# Patient Record
Sex: MALE | Race: WHITE | URBAN - METROPOLITAN AREA
[De-identification: names, ages, dates, MRNs, and addresses within clinical notes are randomized per-mention and may not be internally consistent; named-entity substitution may affect disease eponyms.]

---

## 2019-05-22 ENCOUNTER — OFFICE VISIT (OUTPATIENT)
Dept: URGENT CARE | Facility: CLINIC | Age: 59
End: 2019-05-22
Payer: COMMERCIAL

## 2019-05-22 VITALS
HEART RATE: 65 BPM | DIASTOLIC BLOOD PRESSURE: 73 MMHG | SYSTOLIC BLOOD PRESSURE: 116 MMHG | TEMPERATURE: 98 F | OXYGEN SATURATION: 95 % | RESPIRATION RATE: 16 BRPM | BODY MASS INDEX: 29.82 KG/M2 | HEIGHT: 67 IN | WEIGHT: 190 LBS

## 2019-05-22 DIAGNOSIS — M79.672 LEFT FOOT PAIN: Primary | ICD-10-CM

## 2019-05-22 PROCEDURE — 73630 XR FOOT COMPLETE 3 VIEW LEFT: ICD-10-PCS | Mod: LT,S$GLB,, | Performed by: RADIOLOGY

## 2019-05-22 PROCEDURE — 73630 X-RAY EXAM OF FOOT: CPT | Mod: LT,S$GLB,, | Performed by: RADIOLOGY

## 2019-05-22 PROCEDURE — 99203 OFFICE O/P NEW LOW 30 MIN: CPT | Mod: S$GLB,,, | Performed by: FAMILY MEDICINE

## 2019-05-22 PROCEDURE — 99203 PR OFFICE/OUTPT VISIT, NEW, LEVL III, 30-44 MIN: ICD-10-PCS | Mod: S$GLB,,, | Performed by: FAMILY MEDICINE

## 2019-05-22 RX ORDER — PREDNISONE 20 MG/1
40 TABLET ORAL DAILY
Qty: 10 TABLET | Refills: 0 | Status: SHIPPED | OUTPATIENT
Start: 2019-05-22 | End: 2019-05-27

## 2019-05-22 RX ORDER — NAPROXEN 500 MG/1
500 TABLET ORAL 2 TIMES DAILY WITH MEALS
Qty: 20 TABLET | Refills: 0 | Status: SHIPPED | OUTPATIENT
Start: 2019-05-22

## 2019-05-22 RX ORDER — TRAMADOL HYDROCHLORIDE 50 MG/1
50 TABLET ORAL EVERY 6 HOURS PRN
Qty: 20 TABLET | Refills: 0 | Status: SHIPPED | OUTPATIENT
Start: 2019-05-22 | End: 2020-05-21

## 2019-05-22 RX ORDER — ATORVASTATIN CALCIUM 20 MG/1
TABLET, FILM COATED ORAL
COMMUNITY
Start: 2014-12-15

## 2019-05-22 NOTE — PATIENT INSTRUCTIONS
Please drink plenty of fluids.  Please get plenty of rest.  Please return here or go to the Emergency Department for any concerns or worsening of condition.    If you were prescribed a narcotic medication, do not drive or operate heavy equipment or machinery while taking these medications.    Use a Dr. Coreas's pad with heel area removed to relieve pressure on heel.  If crutches were recommended, use them for touch down ambulation to take weight off the affected foot.      If you were not prescribed an anti-inflammatory medication, and if you do not have any history of stomach/intestinal ulcers, or kidney disease, or are not taking a blood thinner such as Coumadin, Plavix, Pradaxa, Eloquis, or Xaralta for example, it is OK to take over the counter Ibuprofen or Advil or Motrin or Aleve as directed.  Do not take these medications on an empty stomach.  If not allergic, please take over the counter Tylenol (Acetaminophen) and/or Motrin (Ibuprofen) as directed for control of pain and/or fever.    Please follow up with your primary care doctor or specialist as needed.  Theo Gandara MD  [phone]    Podiatry - [place]    Dr. Gomez Earl  [address] [place] [address].  [place]  [address]  [phone]      Plantar Fasciitis  Plantar fasciitis is a painful swelling of the plantar fascia. The plantar fascia is a thick, fibrous layer of tissue. It covers the bones on the bottom of your foot. And it supports the foot bones in an arched position.  This can happen gradually or suddenly. It usually affects one foot at a time. Heel pain can be sharp, like a knife sticking into the bottom of your foot. You may feel pain after exercising, long-distance jogging, stair climbing, long periods of standing, or after standing up.  Risk factors include: non-active lifestyle, arthritis, diabetes, obesity or recent weight gain, flat foot, high arch. Wearing high heels, loose shoes, or shoes with poor arch support for long periods of time  adds to the risk. This problem is commonly found in runners and dancers. It also found in people who stand on hard surfaces for long periods of time.  Foot pain from this condition is usually worse in the morning. But it improves with walking. By the end of the day there may be a dull aching. Treatment requires short-term rest and controlling swelling. It may take up to 9 months before all symptoms go away. Rarely, a steroid injection into the foot, or surgery, may be needed.  Home care  · If you are overweight, lose weight to help healing.  · Choose supportive shoes with good arch support and shock absorbency. Replace athletic shoes when they become worn out. Dont walk or run barefoot.  · Premade or custom-fitted shoe inserts may be helpful. Inserts made of silicone seem to be the most effective. Custom-made inserts can be provided by a podiatrist or foot specialist, physical therapist, or orthopedist.  · Premade or custom-made night splints keep the heel stretched out while you sleep. They may prevent morning pain.  · Avoid activities that stress the feet: jogging, prolonged standing or walking, contact sports, etc.  · First thing in the morning and before sports, stretch the bottom of your feet. Gently flex your ankle so the toes move toward your knee.  · Icing may help control heel pain. Apply an ice pack to the heel for 10-20 minutes as a preventive. Or ice your heel after a severe flare-up of symptoms. You may repeat this every 1-2 hours as needed.  · You may use over-the-counter pain medicine to control pain, unless another medicine was prescribed. Anti-inflammatory pain medicines, such as ibuprofen or naproxen, may work better than acetaminophen. If you have chronic liver or kidney disease or ever had a stomach ulcer or GI bleeding, talk with your healthcare provider before using these medicines.  Follow-up care  Follow up with your healthcare provider, physical therapist, or podiatrist or foot specialist  as advised.  Call for an appointment if pain worsens or there is no relief after a few weeks of home treatment. Shoe inserts, a night splint, or a special boot may be required.  If X-rays were taken, you will be told of any new findings that may affect your care.  When to seek medical advice  Call your healthcare provider right away if any of these occur:  · Foot swelling  · Redness with increasing pain  Date Last Reviewed: 11/21/2015 © 2000-2017 [employer]. [address] [place] [address], [place] [address]. All rights reserved. This information is not intended as a substitute for professional medical care. Always follow your healthcare professional's instructions.

## 2019-05-22 NOTE — PROGRESS NOTES
"Subjective:       Patient ID: Femi Geronimo is a 59 y.o. male.    Vitals:    05/22/19 1215   BP: 116/73   Pulse: 65   Resp: 16   Temp: 97.5 °F (36.4 °C)   SpO2: 95%   Weight: 86.2 kg (190 lb)   Height: 5' 7" (1.702 m)       Chief Complaint: Foot Pain    Pt states left foot pain distal First MT x 3 weeks. Pt states pain is worse after being on his feet all day.    Foot Pain   This is a new problem. The current episode started 1 to 4 weeks ago. The problem occurs constantly. The problem has been unchanged. Associated symptoms include joint swelling. Pertinent negatives include no abdominal pain, chest pain, chills, fever, headaches, nausea, rash, sore throat or vomiting. The symptoms are aggravated by walking. Treatments tried: ibuprofen. The treatment provided moderate relief.     Review of Systems   Constitution: Negative for chills and fever.   HENT: Negative for sore throat.    Eyes: Negative for blurred vision.   Cardiovascular: Negative for chest pain.   Respiratory: Negative for shortness of breath.    Skin: Negative for rash.   Musculoskeletal: Positive for joint pain and joint swelling. Negative for back pain.   Gastrointestinal: Negative for abdominal pain, diarrhea, nausea and vomiting.   Neurological: Negative for headaches.   Psychiatric/Behavioral: The patient is not nervous/anxious.        Objective:      Physical Exam   Constitutional: He is oriented to person, place, and time. He appears well-developed and well-nourished. He is cooperative.  Non-toxic appearance. He does not appear ill. No distress.   HENT:   Head: Normocephalic and atraumatic. Head is without abrasion, without contusion and without laceration.   Right Ear: Hearing, tympanic membrane, external ear and ear canal normal. No hemotympanum.   Left Ear: Hearing, tympanic membrane, external ear and ear canal normal. No hemotympanum.   Nose: Nose normal. No mucosal edema, rhinorrhea or nasal deformity. No epistaxis. Right sinus exhibits no " maxillary sinus tenderness and no frontal sinus tenderness. Left sinus exhibits no maxillary sinus tenderness and no frontal sinus tenderness.   Mouth/Throat: Uvula is midline, oropharynx is clear and moist and mucous membranes are normal. No trismus in the jaw. Normal dentition. No uvula swelling. No posterior oropharyngeal erythema.   Eyes: Pupils are equal, round, and reactive to light. Conjunctivae, EOM and lids are normal. Right eye exhibits no discharge. Left eye exhibits no discharge. No scleral icterus.   Sclera clear bilat   Neck: Trachea normal, normal range of motion, full passive range of motion without pain and phonation normal. Neck supple. No spinous process tenderness and no muscular tenderness present. No neck rigidity. No tracheal deviation present.   Cardiovascular: Normal rate, regular rhythm, normal heart sounds, intact distal pulses and normal pulses.   Pulmonary/Chest: Effort normal and breath sounds normal. No respiratory distress.   Abdominal: Soft. Normal appearance and bowel sounds are normal. He exhibits no distension, no pulsatile midline mass and no mass. There is no tenderness.   Musculoskeletal: He exhibits no edema or deformity.        Left foot: There is decreased range of motion, tenderness and swelling.        Feet:    Neurological: He is alert and oriented to person, place, and time. He has normal strength. No cranial nerve deficit or sensory deficit. He exhibits normal muscle tone. He displays no seizure activity. Coordination normal. GCS eye subscore is 4. GCS verbal subscore is 5. GCS motor subscore is 6.   Skin: Skin is warm, dry and intact. Capillary refill takes less than 2 seconds. No abrasion, no bruising, no burn, no ecchymosis and no laceration noted. He is not diaphoretic. No pallor.   Psychiatric: He has a normal mood and affect. His speech is normal and behavior is normal. Judgment and thought content normal. Cognition and memory are normal.   Nursing note and vitals  reviewed.    Type of Interpretation: ED Physician (Independently Interpreted).  Radiology Procedure Done: Left Foot.  Interpretation: No fx seen.        Assessment:       1. Left foot pain        Plan:       Femi was seen today for foot pain.    Diagnoses and all orders for this visit:    Left foot pain  -     X-Ray Foot Complete Left; Future  -     predniSONE (DELTASONE) 20 MG tablet; Take 2 tablets (40 mg total) by mouth once daily. for 5 days  -     naproxen (NAPROSYN) 500 MG tablet; Take 1 tablet (500 mg total) by mouth 2 (two) times daily with meals.  -     traMADol (ULTRAM) 50 mg tablet; Take 1 tablet (50 mg total) by mouth every 6 (six) hours as needed for Pain.    Please drink plenty of fluids.  Please get plenty of rest.  Please return here or go to the Emergency Department for any concerns or worsening of condition.    If you were prescribed a narcotic medication, do not drive or operate heavy equipment or machinery while taking these medications.    Use a Dr. Coreas's pad with heel area removed to relieve pressure on heel.  If crutches were recommended, use them for touch down ambulation to take weight off the affected foot.      If you were not prescribed an anti-inflammatory medication, and if you do not have any history of stomach/intestinal ulcers, or kidney disease, or are not taking a blood thinner such as Coumadin, Plavix, Pradaxa, Eloquis, or Xaralta for example, it is OK to take over the counter Ibuprofen or Advil or Motrin or Aleve as directed.  Do not take these medications on an empty stomach.  If not allergic, please take over the counter Tylenol (Acetaminophen) and/or Motrin (Ibuprofen) as directed for control of pain and/or fever.    Please follow up with your primary care doctor or specialist as needed.  Theo Gandara MD  [phone]    Podiatry - [facility]  [address] [place] [address].  [place].  [id number]  [phone]

## 2019-05-22 NOTE — LETTER
May 22, 2019  Femi Geronimo  [address] [place] [address] Dr Samara [place] [id number]                [facility] [address] [place] [facility] [address]  Phone: [phone]  Fax: [phone] Femi Geronimo was seen and treated in our Urgent Care department   on 5/22/2019. He may return to work in 2 - 3 days.      If you have any questions or concerns, please don't hesitate to call.    Sincerely,        Amish Obrien MD

## 2019-05-28 ENCOUNTER — TELEPHONE (OUTPATIENT)
Dept: URGENT CARE | Facility: CLINIC | Age: 59
End: 2019-05-28

## 2019-10-03 ENCOUNTER — OFFICE VISIT (OUTPATIENT)
Dept: URGENT CARE | Facility: CLINIC | Age: 59
End: 2019-10-03
Payer: COMMERCIAL

## 2019-10-03 VITALS
TEMPERATURE: 99 F | OXYGEN SATURATION: 97 % | WEIGHT: 215 LBS | HEIGHT: 67 IN | SYSTOLIC BLOOD PRESSURE: 123 MMHG | HEART RATE: 66 BPM | BODY MASS INDEX: 33.74 KG/M2 | RESPIRATION RATE: 18 BRPM | DIASTOLIC BLOOD PRESSURE: 72 MMHG

## 2019-10-03 DIAGNOSIS — S93.401A SPRAIN OF RIGHT ANKLE, UNSPECIFIED LIGAMENT, INITIAL ENCOUNTER: Primary | ICD-10-CM

## 2019-10-03 PROCEDURE — 99214 OFFICE O/P EST MOD 30 MIN: CPT | Mod: S$GLB,,, | Performed by: FAMILY MEDICINE

## 2019-10-03 PROCEDURE — 73610 X-RAY EXAM OF ANKLE: CPT | Mod: RT,S$GLB,, | Performed by: RADIOLOGY

## 2019-10-03 PROCEDURE — 99214 PR OFFICE/OUTPT VISIT, EST, LEVL IV, 30-39 MIN: ICD-10-PCS | Mod: S$GLB,,, | Performed by: FAMILY MEDICINE

## 2019-10-03 PROCEDURE — 73610 XR ANKLE COMPLETE 3 VIEW RIGHT: ICD-10-PCS | Mod: RT,S$GLB,, | Performed by: RADIOLOGY

## 2019-10-03 RX ORDER — NAPROXEN 500 MG/1
500 TABLET ORAL 2 TIMES DAILY WITH MEALS
Qty: 20 TABLET | Refills: 0 | Status: SHIPPED | OUTPATIENT
Start: 2019-10-03 | End: 2019-10-13

## 2019-10-03 NOTE — PATIENT INSTRUCTIONS
PLEASE READ YOUR DISCHARGE INSTRUCTIONS ENTIRELY AS IT CONTAINS IMPORTANT INFORMATION.    Naproxen twice daily with food    Rest, ice, compress, and elevate at home    Avoid prolonged use of the affected area until better.     Please return or see your primary care doctor if you develop new or worsening symptoms.     Please arrange follow up with your primary medical clinic as soon as possible. You must understand that you've received an Urgent Care treatment only and that you may be released before all of your medical problems are known or treated. You, the patient, will arrange for follow up as instructed. If your symptoms worsen or fail to improve you should go to the Emergency Room.    Treating Ankle Sprains  Treatment will depend on how bad your sprain is. For a severe sprain, healing may take 3 months or more.  Right after your injury: Use R.I.C.E.  · Rest: At first, keep weight off the ankle as much as you can. You may be given crutches to help you walk without putting weight on the ankle.  · Ice: Put an ice pack on the ankle for 15 minutes. Remove the pack and wait at least 30 minutes. Repeat for up to 3 days. This helps reduce swelling.  · Compression: To reduce swelling and keep the joint stable, you may need to wrap the ankle with an elastic bandage. For more severe sprains, you may need an ankle brace or a cast.  · Elevation: To reduce swelling, keep your ankle raised above your heart when you sit or lie down.  Medicine  Your healthcare provider may suggest oral non-steroidal anti-inflammatory medicine (NSAIDs), such as ibuprofen. This relieves the pain and helps reduce any swelling. Be sure to take your medicine as directed.  Contrast baths  After 3 days, soak your ankle in warm water for 30 seconds, then in cool water for 30 seconds. Go back and forth for 5 minutes. Doing this every 2 hours will help keep the swelling down.  Exercises    After about 2 to 3 weeks, you may be given exercises to  strengthen the ligaments and muscles in the ankle. Doing these exercises will help prevent another ankle sprain. Exercises may include standing on your toes and then on your heels and doing ankle curls.  Ankle curls  · Sit on the edge of a sturdy table or lie on your back.  · Pull your toes toward you. Then point them away from you. Repeat for 2 to 3 minutes.   Date Last Reviewed: 9/28/2015  © 3190-2579 [employer]. [address] [place] [address], [place] [address]. All rights reserved. This information is not intended as a substitute for professional medical care. Always follow your healthcare professional's instructions.

## 2019-10-03 NOTE — PROGRESS NOTES
"Subjective:       Patient ID: Femi Geronimo is a 59 y.o. male.    Vitals:  height is 5' 7" (1.702 m) and weight is 97.5 kg (215 lb). His temperature is 99.4 °F (37.4 °C). His blood pressure is 123/72 and his pulse is 66. His respiration is 18 and oxygen saturation is 97%.     Chief Complaint: Ankle Injury (Right )    Pt states he twisted (everted) his right ankle last Sunday working around the house. Pt states the injury doesn't feel like it's healing and the pain is starting to radiate up his right leg if walking too long. Has been taking ibuprofen and tylenol.     Ankle Injury    The incident occurred more than 1 week ago. The incident occurred at home. The injury mechanism was a twisting injury. The pain is present in the right ankle. The quality of the pain is described as shooting. The pain is moderate. The pain has been constant since onset. Associated symptoms include an inability to bear weight. He reports no foreign bodies present. The symptoms are aggravated by movement and weight bearing. He has tried NSAIDs (IB ) for the symptoms. The treatment provided no relief.       Constitution: Negative for chills, fatigue and fever.   HENT: Negative for congestion and sore throat.    Neck: Negative for painful lymph nodes.   Cardiovascular: Negative for chest pain and leg swelling.   Eyes: Negative for double vision and blurred vision.   Respiratory: Negative for cough and shortness of breath.    Gastrointestinal: Negative for nausea, vomiting and diarrhea.   Genitourinary: Negative for dysuria, frequency and urgency.   Musculoskeletal: Positive for pain, joint pain and joint swelling. Negative for muscle cramps and muscle ache.   Skin: Negative for color change, pale and rash.   Allergic/Immunologic: Negative for seasonal allergies.   Neurological: Negative for dizziness, history of vertigo, light-headedness, passing out and headaches.   Hematologic/Lymphatic: Negative for swollen lymph nodes, easy " bruising/bleeding and history of blood clots. Does not bruise/bleed easily.   Psychiatric/Behavioral: Negative for nervous/anxious, sleep disturbance and depression. The patient is not nervous/anxious.        Objective:      Physical Exam   Constitutional: He is oriented to person, place, and time. He appears well-developed and well-nourished. He is cooperative.  Non-toxic appearance. He does not appear ill. No distress.   HENT:   Head: Normocephalic and atraumatic. Head is without abrasion, without contusion and without laceration.   Right Ear: Hearing, tympanic membrane, external ear and ear canal normal. No hemotympanum.   Left Ear: Hearing, tympanic membrane, external ear and ear canal normal. No hemotympanum.   Nose: Nose normal. No mucosal edema, rhinorrhea or nasal deformity. No epistaxis. Right sinus exhibits no maxillary sinus tenderness and no frontal sinus tenderness. Left sinus exhibits no maxillary sinus tenderness and no frontal sinus tenderness.   Mouth/Throat: Uvula is midline, oropharynx is clear and moist and mucous membranes are normal. No trismus in the jaw. Normal dentition. No uvula swelling. No posterior oropharyngeal erythema.   Eyes: Pupils are equal, round, and reactive to light. Conjunctivae, EOM and lids are normal. Right eye exhibits no discharge. Left eye exhibits no discharge. No scleral icterus.   Sclera clear bilat   Neck: Trachea normal, normal range of motion, full passive range of motion without pain and phonation normal. Neck supple. No spinous process tenderness and no muscular tenderness present. No neck rigidity. No tracheal deviation present.   Cardiovascular: Normal rate, regular rhythm, normal heart sounds, intact distal pulses and normal pulses.   Pulmonary/Chest: Effort normal and breath sounds normal. No respiratory distress.   Abdominal: Soft. Normal appearance and bowel sounds are normal. He exhibits no distension, no pulsatile midline mass and no mass. There is no  tenderness.   Musculoskeletal: Normal range of motion. He exhibits no edema or deformity.        Right ankle: He exhibits swelling (mild medial). He exhibits normal range of motion and no laceration. Tenderness. Medial malleolus tenderness found. Achilles tendon exhibits no pain.   Cap refill 2 seconds, pedal pulse 2+, sensation intact  Able to bear weight and walk but with limp   Neurological: He is alert and oriented to person, place, and time. He has normal strength. No cranial nerve deficit or sensory deficit. He exhibits normal muscle tone. He displays no seizure activity. Coordination normal. GCS eye subscore is 4. GCS verbal subscore is 5. GCS motor subscore is 6.   Skin: Skin is warm, dry and intact. Capillary refill takes less than 2 seconds. No abrasion, no bruising, no burn, no ecchymosis and no laceration noted. He is not diaphoretic. No pallor.   Psychiatric: He has a normal mood and affect. His speech is normal and behavior is normal. Judgment and thought content normal. Cognition and memory are normal.   Nursing note and vitals reviewed.    Xr Ankle Complete 3 View Right    Result Date: 10/3/2019  EXAMINATION: XR ANKLE COMPLETE 3 VIEW RIGHT CLINICAL HISTORY: foot inverted 1.5 weeks ago, pain medial; Unspecified injury of right ankle, initial encounter TECHNIQUE: AP, lateral, and oblique images of the right ankle were performed. COMPARISON: None FINDINGS: Three views right ankle. No acute displaced fracture or dislocation of the ankle.  No radiopaque foreign body.  The ankle mortise is intact.  Degenerative changes are noted of the calcaneus.  There is mild edema about the ankle, particularly overlying the medial malleolus.     1. No acute displaced fracture or dislocation of the ankle noting nonspecific edema. Electronically signed by: Tristin Pizarro MD Date:    10/03/2019 Time:    15:44    Assessment:       1. Sprain of right ankle, unspecified ligament, initial encounter        Plan:         Sprain  of right ankle, unspecified ligament, initial encounter  -     XR ANKLE COMPLETE 3 VIEW RIGHT; Future; Expected date: 10/03/2019  -     naproxen (NAPROSYN) 500 MG tablet; Take 1 tablet (500 mg total) by mouth 2 (two) times daily with meals. for 10 days  Dispense: 20 tablet; Refill: 0     Declined Ace wrap or ankle brace in the clinic will get something at his pharmacy  F/u with ortho if pain not improving.    Patient Instructions     PLEASE READ YOUR DISCHARGE INSTRUCTIONS ENTIRELY AS IT CONTAINS IMPORTANT INFORMATION.    Naproxen twice daily with food    Rest, ice, compress, and elevate at home    Avoid prolonged use of the affected area until better.     Please return or see your primary care doctor if you develop new or worsening symptoms.     Please arrange follow up with your primary medical clinic as soon as possible. You must understand that you've received an Urgent Care treatment only and that you may be released before all of your medical problems are known or treated. You, the patient, will arrange for follow up as instructed. If your symptoms worsen or fail to improve you should go to the Emergency Room.    Treating Ankle Sprains  Treatment will depend on how bad your sprain is. For a severe sprain, healing may take 3 months or more.  Right after your injury: Use R.I.C.E.  · Rest: At first, keep weight off the ankle as much as you can. You may be given crutches to help you walk without putting weight on the ankle.  · Ice: Put an ice pack on the ankle for 15 minutes. Remove the pack and wait at least 30 minutes. Repeat for up to 3 days. This helps reduce swelling.  · Compression: To reduce swelling and keep the joint stable, you may need to wrap the ankle with an elastic bandage. For more severe sprains, you may need an ankle brace or a cast.  · Elevation: To reduce swelling, keep your ankle raised above your heart when you sit or lie down.  Medicine  Your healthcare provider may suggest oral  non-steroidal anti-inflammatory medicine (NSAIDs), such as ibuprofen. This relieves the pain and helps reduce any swelling. Be sure to take your medicine as directed.  Contrast baths  After 3 days, soak your ankle in warm water for 30 seconds, then in cool water for 30 seconds. Go back and forth for 5 minutes. Doing this every 2 hours will help keep the swelling down.  Exercises    After about 2 to 3 weeks, you may be given exercises to strengthen the ligaments and muscles in the ankle. Doing these exercises will help prevent another ankle sprain. Exercises may include standing on your toes and then on your heels and doing ankle curls.  Ankle curls  · Sit on the edge of a sturdy table or lie on your back.  · Pull your toes toward you. Then point them away from you. Repeat for 2 to 3 minutes.   Date Last Reviewed: 9/28/2015  © 8108-5975 [employer]. [address] [place] [address], [place] [address]. All rights reserved. This information is not intended as a substitute for professional medical care. Always follow your healthcare professional's instructions.

## 2020-07-17 ENCOUNTER — CLINICAL SUPPORT (OUTPATIENT)
Dept: URGENT CARE | Facility: CLINIC | Age: 60
End: 2020-07-17
Payer: COMMERCIAL

## 2020-07-17 VITALS — TEMPERATURE: 98 F

## 2020-07-17 DIAGNOSIS — Z01.84 ENCOUNTER FOR ANTIBODY RESPONSE EXAMINATION: ICD-10-CM

## 2020-07-17 PROCEDURE — 99211 OFF/OP EST MAY X REQ PHY/QHP: CPT | Mod: S$GLB,,, | Performed by: FAMILY MEDICINE

## 2020-07-17 PROCEDURE — 86769 SARS-COV-2 COVID-19 ANTIBODY: CPT

## 2020-07-17 PROCEDURE — 99211 PR OFFICE/OUTPT VISIT, EST, LEVL I: ICD-10-PCS | Mod: S$GLB,,, | Performed by: FAMILY MEDICINE

## 2020-07-18 LAB — SARS-COV-2 IGG SERPLBLD QL IA.RAPID: NEGATIVE

## 2020-07-19 ENCOUNTER — TELEPHONE (OUTPATIENT)
Dept: URGENT CARE | Facility: CLINIC | Age: 60
End: 2020-07-19

## 2023-03-09 DIAGNOSIS — M54.50 LOW BACK PAIN: Primary | ICD-10-CM

## 2023-03-09 DIAGNOSIS — M54.50 LOW BACK PAIN, UNSPECIFIED: Primary | ICD-10-CM

## 2023-03-27 ENCOUNTER — CLINICAL SUPPORT (OUTPATIENT)
Dept: REHABILITATION | Facility: HOSPITAL | Age: 63
End: 2023-03-27
Payer: COMMERCIAL

## 2023-03-27 DIAGNOSIS — M54.42 ACUTE LEFT-SIDED LOW BACK PAIN WITH LEFT-SIDED SCIATICA: ICD-10-CM

## 2023-03-27 DIAGNOSIS — M54.32 SCIATIC PAIN, LEFT: ICD-10-CM

## 2023-03-27 PROCEDURE — 97112 NEUROMUSCULAR REEDUCATION: CPT | Mod: PO

## 2023-03-27 PROCEDURE — 97110 THERAPEUTIC EXERCISES: CPT | Mod: PO

## 2023-03-27 PROCEDURE — 97161 PT EVAL LOW COMPLEX 20 MIN: CPT | Mod: PO

## 2023-03-27 NOTE — PROGRESS NOTES
[facility]   Physical Therapy Initial Evaluation     Date: 3/27/2023   Name: Femi eGronimo  Clinic Number: [id number]    Therapy Diagnosis:   Encounter Diagnoses   Name Primary?    Acute left-sided low back pain with left-sided sciatica     Sciatic pain, left      Physician: Adebayo Gaspar MD    Physician Orders: PT Eval and Treat   Medical Diagnosis from Referral: M54.50 (ICD-10-CM) - Low back pain  Evaluation Date: 3/27/2023  Authorization Period Expiration: 12/29/23  Plan of Care Expiration: 6/19/2023  Progress Note Due: 4/24/2023  Visit # / Visits authorized: -/ 20       Precautions: Standard    Time In: 0702  Time Out: 0802  Total Appointment Time (timed & untimed codes): 60 minutes      SUBJECTIVE   Date of onset: 45 days ago    History of current condition - Femi reports: was lifting a bag of cement. He states he felt fine until a week later. He states he started to feel a twinge and pain in his low back with changes in positions and with lying down. Pt states pain that would sometimes radiate into buttocks and L knee. He states since then with pain medication and rest his pain has significantly improved with intermittent twinge of pain.    Falls: None    Imaging, MRI studies: awaiting results    Prior Therapy: None  Social History:  lives with their spouse  Occupation: Retired ; on and off  Prior Level of Function: IND  Current Level of Function: IND with pain    Pain:  Current 2/10, worst 5/10, best 1/10   Location: left back  back - lumbar  Description: Sharp and Shooting  Aggravating Factors: Standing and Laying  Easing Factors: rest and sitting    Patients goals: TO get rid of the pain     Medical History:   Past Medical History:   Diagnosis Date    Hyperlipidemia        Surgical History:   Femi Geronimo  has a past surgical history that includes Ganglion cyst excision.    Medications:   Femi has a current medication list which includes the following  "prescription(s): atorvastatin and naproxen.    Allergies:   Review of patient's allergies indicates:  No Known Allergies       OBJECTIVE     Posture:  Pt with rounded shoulder, increased thoracic kyphosis, and forward head    Lumbar Range of Motion:    Limitations Pain   Flexion 50% Limitation in segmental lumbar mobility   none        Extension 25% limitation   none        Left Side Bending WNL None        Right Side Bending WNL None            Lower Extremity Strength  Right LE  Left LE    Quadriceps: 4+/5 Quadriceps: 4-/5   Hamstrings: 4+/5 Hamstrings: 4/5   Iliospoas: 4+/5 Iliospoas: 4-/5   Hip extension:  4+/5 Hip extension: 4-/5   PGM: 4-/5 PGM: 3+/5   Hip ER:  4-/5 Hip ER: 4-/5   Hip IR: 4/5 Hip IR: 4-/5   Ankle dorsiflexion: 4/5 Ankle dorsiflexion: 3+/5   Ankle plantarflexion: 4/5 Ankle plantarflexion: 3+/5     Sensation:Intact    Reflexes:  -Patellar (L3-L4): +1  -Achilles (S1): +1    Special Tests:  -SLR Test: -  -Repeated Flexion: -  -Repeated Ext: -  -Prone Instability Test: -  -Bridge Test: -  -Slump Test: +    SI Special Tests:   Distraction: -  Compression: -  SI thigh thrust: -  Sacral thrust: -  Flick test: -    Palpation: ttp  -Erector Spinae: -  -Multifidi activation: -    Flexibility:   -Ely's test: R = +; L = +    -Hamstring : R = + ; L = +  -Nam's test: R = + ; L = +   Michael Test Right  Left    Iliopsoas + +   Rectus Femoris  + +       Limitation/Restriction for FOTO EVAL Survey    Therapist reviewed FOTO scores for Femi Geronimo on 3/27/2023.   FOTO documents entered into [employer] - see Media section.    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 The following activities were included:  -1x15 Pelvic Tilts   -2x15 Bridging  -2X15 Standing Hip ABD vs RTB  -2x15 Standing Hip EXT vs RTB      PATIENT EDUCATION AND HOME EXERCISES     Education provided:   - HEP    Written Home Exercises Provided: yes. Exercises were reviewed and Femi was able to demonstrate them prior to the end of the session.  Femi demonstrated good  understanding of the education provided. See EMR under Patient Instructions for exercises provided during therapy sessions.    ASSESSMENT     Femi is a 63 y.o. male referred to outpatient Physical Therapy with a medical diagnosis of M54.50 (ICD-10-CM) - Low back pain. Patient presents with  -Decreased function (LEFS)  -Increased pain (NPS)   -Decreased pain free Lumbar AROM  -Decreased LE strength (MMT)   -Decreased LE mobility   -Decreased participation in regular exercise routine  -(+) TTP on piriformis    Patient prognosis is Excellent.   Patientt will benefit from skilled outpatient Physical Therapy to address the deficits stated above and in the chart below, provide patient /family education, and to maximize patientt's level of independence.     Plan of care discussed with patient: Yes  Patient's spiritual, cultural and educational needs considered and patient is agreeable to the plan of care and goals as stated below:     Anticipated Barriers for therapy: None    Medical Necessity is demonstrated by the following  History  Co-morbidities and personal factors that may impact the plan of care Co-morbidities:   Hyperlipidemia    Personal Factors:   no deficits     low   Examination  Body Structures and Functions, activity limitations and participation restrictions that may impact the plan of care Body Regions:   back  lower extremities    Body Systems:    gross " symmetry  ROM  strength  gross coordinated movement  balance  gait  transfers  transitions    Participation Restrictions:   None    Activity limitations:   Learning and applying knowledge  no deficits    General Tasks and Commands  no deficits    Communication  no deficits    Mobility  no deficits    Self care  no deficits    Domestic Life  no deficits    Interactions/Relationships  no deficits    Life Areas  no deficits    Community and Social Life  no deficits         low   Clinical Presentation stable and uncomplicated low   Decision Making/ Complexity Score: low     GOALS: Short Term Goals:  4 weeks  1.Report decreased low back pain  < / =  2/10  to increase tolerance for 15 minutes of standing  2. Increase ROM by 10 degrees where limited in order to perform ADLs without difficulty.  3. Increase strength by 1/3 MMT grade in abduction  to increase tolerance for ADL and work activities.  4. 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  4. Pt will report at D on FOTO  to demonstrate increase in LE function with every day tasks.       PLAN   Plan of care Certification: 3/27/2023 to 6/19/2023.    Outpatient Physical Therapy 2 times weekly for 10 weeks to include the following interventions: Gait Training, Moist Heat/ Ice, Neuromuscular Re-ed, Patient Education, Self Care, Therapeutic Activities, and Therapeutic Exercise.     Kelley Godoy, PT      I CERTIFY THE NEED FOR THESE SERVICES FURNISHED UNDER THIS PLAN OF TREATMENT AND WHILE UNDER MY CARE   Physician's comments:     Physician's Signature: ___________________________________________________

## 2023-03-27 NOTE — PLAN OF CARE
[facility]   Physical Therapy Initial Evaluation     Date: 3/27/2023   Name: Femi Geronimo  Clinic Number: [id number]    Therapy Diagnosis:   Encounter Diagnoses   Name Primary?    Acute left-sided low back pain with left-sided sciatica     Sciatic pain, left      Physician: Adebayo Gaspar MD    Physician Orders: PT Eval and Treat   Medical Diagnosis from Referral: M54.50 (ICD-10-CM) - Low back pain  Evaluation Date: 3/27/2023  Authorization Period Expiration: 12/29/23  Plan of Care Expiration: 6/19/2023  Progress Note Due: 4/24/2023  Visit # / Visits authorized: -/ 20       Precautions: Standard    Time In: 0702  Time Out: 0802  Total Appointment Time (timed & untimed codes): 60 minutes      SUBJECTIVE   Date of onset: 45 days ago    History of current condition - Femi reports: was lifting a bag of cement. He states he felt fine until a week later. He states he started to feel a twinge and pain in his low back with changes in positions and with lying down. Pt states pain that would sometimes radiate into buttocks and L knee. He states since then with pain medication and rest his pain has significantly improved with intermittent twinge of pain.    Falls: None    Imaging, MRI studies: awaiting results    Prior Therapy: None  Social History:  lives with their spouse  Occupation: Retired ; on and off  Prior Level of Function: IND  Current Level of Function: IND with pain    Pain:  Current 2/10, worst 5/10, best 1/10   Location: left back  back - lumbar  Description: Sharp and Shooting  Aggravating Factors: Standing and Laying  Easing Factors: rest and sitting    Patients goals: TO get rid of the pain     Medical History:   Past Medical History:   Diagnosis Date    Hyperlipidemia        Surgical History:   Femi Geronimo  has a past surgical history that includes Ganglion cyst excision.    Medications:   Femi has a current medication list which includes the following  "prescription(s): atorvastatin and naproxen.    Allergies:   Review of patient's allergies indicates:  No Known Allergies       OBJECTIVE     Posture:  Pt with rounded shoulder, increased thoracic kyphosis, and forward head    Lumbar Range of Motion:    Limitations Pain   Flexion 50% Limitation in segmental lumbar mobility   none        Extension 25% limitation   none        Left Side Bending WNL None        Right Side Bending WNL None            Lower Extremity Strength  Right LE  Left LE    Quadriceps: 4+/5 Quadriceps: 4-/5   Hamstrings: 4+/5 Hamstrings: 4/5   Iliospoas: 4+/5 Iliospoas: 4-/5   Hip extension:  4+/5 Hip extension: 4-/5   PGM: 4-/5 PGM: 3+/5   Hip ER:  4-/5 Hip ER: 4-/5   Hip IR: 4/5 Hip IR: 4-/5   Ankle dorsiflexion: 4/5 Ankle dorsiflexion: 3+/5   Ankle plantarflexion: 4/5 Ankle plantarflexion: 3+/5     Sensation:Intact    Reflexes:  -Patellar (L3-L4): +1  -Achilles (S1): +1    Special Tests:  -SLR Test: -  -Repeated Flexion: -  -Repeated Ext: -  -Prone Instability Test: -  -Bridge Test: -  -Slump Test: +    SI Special Tests:   Distraction: -  Compression: -  SI thigh thrust: -  Sacral thrust: -  Flick test: -    Palpation: ttp  -Erector Spinae: -  -Multifidi activation: -    Flexibility:   -Ely's test: R = +; L = +    -Hamstring : R = + ; L = +  -Nam's test: R = + ; L = +   Michael Test Right  Left    Iliopsoas + +   Rectus Femoris  + +       Limitation/Restriction for FOTO EVAL Survey    Therapist reviewed FOTO scores for Femi Geronimo on 3/27/2023.   FOTO documents entered into [employer] - see Media section.    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 The following activities were included:  -1x15 Pelvic Tilts   -2x15 Bridging  -2X15 Standing Hip ABD vs RTB  -2x15 Standing Hip EXT vs RTB      PATIENT EDUCATION AND HOME EXERCISES     Education provided:   - HEP    Written Home Exercises Provided: yes. Exercises were reviewed and Femi was able to demonstrate them prior to the end of the session.  Femi demonstrated good  understanding of the education provided. See EMR under Patient Instructions for exercises provided during therapy sessions.    ASSESSMENT     Femi is a 63 y.o. male referred to outpatient Physical Therapy with a medical diagnosis of M54.50 (ICD-10-CM) - Low back pain. Patient presents with  -Decreased function (LEFS)  -Increased pain (NPS)   -Decreased pain free Lumbar AROM  -Decreased LE strength (MMT)   -Decreased LE mobility   -Decreased participation in regular exercise routine  -(+) TTP on piriformis    Patient prognosis is Excellent.   Patientt will benefit from skilled outpatient Physical Therapy to address the deficits stated above and in the chart below, provide patient /family education, and to maximize patientt's level of independence.     Plan of care discussed with patient: Yes  Patient's spiritual, cultural and educational needs considered and patient is agreeable to the plan of care and goals as stated below:     Anticipated Barriers for therapy: None    Medical Necessity is demonstrated by the following  History  Co-morbidities and personal factors that may impact the plan of care Co-morbidities:   Hyperlipidemia    Personal Factors:   no deficits     low   Examination  Body Structures and Functions, activity limitations and participation restrictions that may impact the plan of care Body Regions:   back  lower extremities    Body Systems:    gross " symmetry  ROM  strength  gross coordinated movement  balance  gait  transfers  transitions    Participation Restrictions:   None    Activity limitations:   Learning and applying knowledge  no deficits    General Tasks and Commands  no deficits    Communication  no deficits    Mobility  no deficits    Self care  no deficits    Domestic Life  no deficits    Interactions/Relationships  no deficits    Life Areas  no deficits    Community and Social Life  no deficits         low   Clinical Presentation stable and uncomplicated low   Decision Making/ Complexity Score: low     GOALS: Short Term Goals:  4 weeks  1.Report decreased low back pain  < / =  2/10  to increase tolerance for 15 minutes of standing  2. Increase ROM by 10 degrees where limited in order to perform ADLs without difficulty.  3. Increase strength by 1/3 MMT grade in abduction  to increase tolerance for ADL and work activities.  4. 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  4. Pt will report at D on FOTO  to demonstrate increase in LE function with every day tasks.       PLAN   Plan of care Certification: 3/27/2023 to 6/19/2023.    Outpatient Physical Therapy 2 times weekly for 10 weeks to include the following interventions: Gait Training, Moist Heat/ Ice, Neuromuscular Re-ed, Patient Education, Self Care, Therapeutic Activities, and Therapeutic Exercise.     Kelley Godoy, PT      I CERTIFY THE NEED FOR THESE SERVICES FURNISHED UNDER THIS PLAN OF TREATMENT AND WHILE UNDER MY CARE   Physician's comments:     Physician's Signature: ___________________________________________________

## 2023-03-29 ENCOUNTER — CLINICAL SUPPORT (OUTPATIENT)
Dept: REHABILITATION | Facility: HOSPITAL | Age: 63
End: 2023-03-29
Payer: COMMERCIAL

## 2023-03-29 DIAGNOSIS — M54.42 ACUTE LEFT-SIDED LOW BACK PAIN WITH LEFT-SIDED SCIATICA: ICD-10-CM

## 2023-03-29 DIAGNOSIS — M54.32 SCIATIC PAIN, LEFT: Primary | ICD-10-CM

## 2023-03-29 PROCEDURE — 97530 THERAPEUTIC ACTIVITIES: CPT | Mod: PO

## 2023-03-29 PROCEDURE — 97110 THERAPEUTIC EXERCISES: CPT | Mod: PO

## 2023-03-29 PROCEDURE — 97112 NEUROMUSCULAR REEDUCATION: CPT | Mod: PO

## 2023-03-29 NOTE — PROGRESS NOTES
"  Physical Therapy Daily Treatment Note     Name: Femi Geronimo  Clinic Number: [id number]    Therapy Diagnosis:   Encounter Diagnoses   Name Primary?    Sciatic pain, left Yes    Acute left-sided low back pain with left-sided sciatica      Physician: Adebayo Gaspar MD    Visit Date: 3/29/2023  Physician Orders: PT Eval and Treat   Medical Diagnosis from Referral: M54.50 (ICD-10-CM) - Low back pain  Evaluation Date: 3/27/2023  Authorization Period Expiration: 12/29/23  Plan of Care Expiration: 6/19/2023  Progress Note Due: 4/24/2023  Visit # / Visits authorized: -/ 20     Time In: 0800  Time Out: 0857  Total Billable Time: 57 minutes    Precautions: Standard    Subjective     Pt reports: "I'm doing good.".  He was compliant with home exercise program.  Response to previous treatment: soreness  Functional change: First f/u    Pain: 0/10  Location: left back  and Leg hip/LE      Objective     Femi received therapeutic exercises to develop strength, endurance, ROM, and core stabilization for 23 minutes including:  -3X20" Hamstring stretch  -3x20" Supine jarocho stretch with strap  -3x20" Seated piriformis stretch  -2x15 Modified RDLs   -2x12 Bridging  -2X12 Quadruped Hip Extensions each side  -2X12 Quadruped Hydrants each side  -2x12 SLR vs 2LB ankle weight each side    Femi participated in neuromuscular re-education activities to improve: Balance and Posture for 23 minutes. The following activities were included:  -2X15 Standing Hip ABD vs RTB  -2x15 Standing Hip EXT vs RTB  -2x20' Sidestepping vs RTB  -3x20' Monsters steps  -2X12 Bridging with marches     Femi participated in dynamic functional therapeutic activities to improve functional performance for 11  minutes, including:  -Recumbent bike x7 minutes level 3  -Deadbugs with PB 2x10 Each side     Home Exercises Provided and Patient Education Provided     Education provided:   - HEP    Written Home Exercises Provided: Patient instructed to cont prior HEP.  Exercises " were reviewed and Femi was able to demonstrate them prior to the end of the session.  Femi demonstrated good  understanding of the education provided.     See EMR under Patient Instructions for exercises provided prior visit.    Assessment     Pt tolerates session well with good tolerance to progression of core stabilization and hip strengthening exercises. Pt continues to be limited by decreased core stabilization and decreased muscular endurance.    Femi Is progressing well towards his goals.   Pt prognosis is Excellent.     Pt will continue to benefit from skilled outpatient physical therapy to address the deficits listed in the problem list box on initial evaluation, provide pt/family education and to maximize pt's level of independence in the home and community environment.     Pt's spiritual, cultural and educational needs considered and pt agreeable to plan of care and goals.    Anticipated barriers to physical therapy: none    GOALS: Short Term Goals:  4 weeks  1.Report decreased low back pain  < / =  2/10  to increase tolerance for 15 minutes of standing  2. Increase ROM by 10 degrees where limited in order to perform ADLs without difficulty.  3. Increase strength by 1/3 MMT grade in abduction  to increase tolerance for ADL and work activities.  4. 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  4. Pt will report at TBD on FOTO  to demonstrate increase in LE function with every day tasks.     Plan     Plan of care Certification: 3/27/2023 to 6/19/2023.     Outpatient Physical Therapy 2 times weekly for 10 weeks to include the following interventions: Gait Training, Moist Heat/ Ice,  Neuromuscular Re-ed, Patient Education, Self Care, Therapeutic Activities, and Therapeutic Exercise.     Kelley Godoy, PT

## 2023-04-03 ENCOUNTER — CLINICAL SUPPORT (OUTPATIENT)
Dept: REHABILITATION | Facility: HOSPITAL | Age: 63
End: 2023-04-03
Payer: COMMERCIAL

## 2023-04-03 DIAGNOSIS — M54.32 SCIATIC PAIN, LEFT: Primary | ICD-10-CM

## 2023-04-03 DIAGNOSIS — M54.42 ACUTE LEFT-SIDED LOW BACK PAIN WITH LEFT-SIDED SCIATICA: ICD-10-CM

## 2023-04-03 PROCEDURE — 97112 NEUROMUSCULAR REEDUCATION: CPT | Mod: PO

## 2023-04-03 PROCEDURE — 97530 THERAPEUTIC ACTIVITIES: CPT | Mod: PO

## 2023-04-03 PROCEDURE — 97110 THERAPEUTIC EXERCISES: CPT | Mod: PO

## 2023-04-03 NOTE — PROGRESS NOTES
"  Physical Therapy Daily Treatment Note     Name: Femi Geronimo  [facility] Number: [id number]    Therapy Diagnosis:   No diagnosis found.    Physician: Adebayo Gaspar MD    Visit Date: 4/3/2023  Physician Orders: PT Eval and Treat   Medical Diagnosis from Referral: M54.50 (ICD-10-CM) - Low back pain  Evaluation Date: 3/27/2023  Authorization Period Expiration: 12/29/23  Plan of Care Expiration: 6/19/2023  Progress Note Due: 4/24/2023  Visit # / Visits authorized: 1/1 eval, 2/20    Precautions: Standard    Time In: 7:55 am  Time Out: 8:50 am  Total Billable Time: 55 minutes    Subjective     Pt reports: "My exercises are going well and I am not having any pain today."    He was compliant with home exercise program.  Response to previous treatment: No adverse effects.  Functional change: No pain.    Pain: 0/10  Location: Left back, hip, and lower extremity.    Objective     Therapeutic Activities to improve functional performance for 15 minutes, including:  -Recumbent Bike, 8 minutes, level 3.0  -3x15, Hip Hinges from Stool, 20# kettlebell    Neuromuscular Re-education activities to improve: Balance and Posture for 25 minutes. The following activities were included:   -3x15, Paloff Press, GTB   -3x10 each side, Deadbugs with Green PB   -3x10, Bridges + Alt Marches   -3x25' each direction, Monster Walks - Forwards/Backwards, RTB   -3x25' each direction, Lateral Band Walks, RTB    Therapeutic Exercise to develop strength, endurance, ROM, and core stabilization for 15 minutes including:  -3x30", Supine Hamstring Strap Stretch, bilateral  -3x30", Supine Michael Strap Stretch, bilateral  -3x30", Seated Piriformis Stretch, bilateral  -3x30", Seated Hamstring Stretch, bilateral  -3x30", Standing Gastroc Stair Stretch, bilateral    Not Performed:  -2x15 Modified RDLs   -2X12 Quadruped Hip Extensions each side  -2X12 Quadruped Hydrants each side  -2x12 SLR vs 2LB ankle weight each side  -2X15 Standing Hip ABD vs RTB  -2x15 Standing " Hip EXT vs RTB    Home Exercises Provided and Patient Education Provided   Education provided:   - HEP Review    Written Home Exercises Provided: Patient instructed to cont prior HEP.  Exercises were reviewed and Femi was able to demonstrate them prior to the end of the session.  Femi demonstrated good  understanding of the education provided.     See EMR under Patient Instructions for exercises provided prior visit.    Assessment     Patient with continued excellent response to current plan of care and home exercise program reporting no symptoms and good compliance with recommendations thus far. Progressed core strengthening with addition of Paloff press and resited hip hinging, both of which he tolerated well with c/o adverse effects. Instructed patient to continue with current HEP which he verbalized understanding of.    Femi Is progressing well towards his goals.   Pt prognosis is Excellent.     Pt will continue to benefit from skilled outpatient physical therapy to address the deficits listed in the problem list box on initial evaluation, provide pt/family education and to maximize pt's level of independence in the home and community environment.     Pt's spiritual, cultural and educational needs considered and pt agreeable to plan of care and goals.    Anticipated barriers to physical therapy: none    GOALS: Short Term Goals:  4 weeks  1.Report decreased low back pain  < / =  2/10  to increase tolerance for 15 minutes of standing (Met 04/03)  2. Increase ROM by 10 degrees where limited in order to perform ADLs without difficulty.  3. Increase strength by 1/3 MMT grade in abduction  to increase tolerance for ADL and work activities.  4. 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  4. Pt will report at D on FOTO  to demonstrate increase in LE function with every day tasks.     Plan     Plan of care Certification: 3/27/2023 to 6/19/2023.     Outpatient Physical Therapy 2 times weekly for 10 weeks to include the following interventions: Gait Training, Moist Heat/ Ice, Neuromuscular Re-ed, Patient Education, Self Care, Therapeutic Activities, and Therapeutic Exercise.     Jorge Ron, PT

## 2024-11-21 ENCOUNTER — OFFICE VISIT (OUTPATIENT)
Dept: URGENT CARE | Facility: CLINIC | Age: 64
End: 2024-11-21
Payer: COMMERCIAL

## 2024-11-21 VITALS
SYSTOLIC BLOOD PRESSURE: 124 MMHG | HEIGHT: 67 IN | RESPIRATION RATE: 16 BRPM | OXYGEN SATURATION: 97 % | WEIGHT: 248 LBS | TEMPERATURE: 98 F | DIASTOLIC BLOOD PRESSURE: 81 MMHG | BODY MASS INDEX: 38.92 KG/M2 | HEART RATE: 60 BPM

## 2024-11-21 DIAGNOSIS — R05.1 ACUTE COUGH: ICD-10-CM

## 2024-11-21 DIAGNOSIS — J02.9 SORE THROAT: Primary | ICD-10-CM

## 2024-11-21 DIAGNOSIS — J02.0 STREP PHARYNGITIS: ICD-10-CM

## 2024-11-21 DIAGNOSIS — R09.82 POSTNASAL DRIP: ICD-10-CM

## 2024-11-21 LAB
CTP QC/QA: YES
MOLECULAR STREP A: POSITIVE

## 2024-11-21 PROCEDURE — 87651 STREP A DNA AMP PROBE: CPT | Mod: QW,S$GLB,, | Performed by: FAMILY MEDICINE

## 2024-11-21 PROCEDURE — 99203 OFFICE O/P NEW LOW 30 MIN: CPT | Mod: S$GLB,,, | Performed by: FAMILY MEDICINE

## 2024-11-21 RX ORDER — PROMETHAZINE HYDROCHLORIDE AND DEXTROMETHORPHAN HYDROBROMIDE 6.25; 15 MG/5ML; MG/5ML
5 SYRUP ORAL NIGHTLY PRN
Qty: 35 ML | Refills: 0 | Status: SHIPPED | OUTPATIENT
Start: 2024-11-21 | End: 2024-11-28

## 2024-11-21 RX ORDER — AMOXICILLIN 500 MG/1
500 TABLET, FILM COATED ORAL EVERY 12 HOURS
Qty: 20 TABLET | Refills: 0 | Status: SHIPPED | OUTPATIENT
Start: 2024-11-21 | End: 2024-12-01

## 2024-11-21 RX ORDER — FLUTICASONE PROPIONATE 50 MCG
2 SPRAY, SUSPENSION (ML) NASAL 2 TIMES DAILY
Qty: 9.9 ML | Refills: 0 | Status: SHIPPED | OUTPATIENT
Start: 2024-11-21 | End: 2024-11-28

## 2024-11-21 NOTE — PROGRESS NOTES
"Subjective:      Patient ID: Femi Geronimo is a 64 y.o. male.    Vitals:  height is 5' 7" (1.702 m) and weight is 112.5 kg (248 lb). His tympanic temperature is 98.2 °F (36.8 °C). His blood pressure is 124/81 and his pulse is 60. His respiration is 16 and oxygen saturation is 97%.     Chief Complaint: Sore Throat    Patient reports sore throat started yesterday. Patient took a home COVID test today that was negative. Patient took Sudafed, Nyquil, Afrin and throat lozenges.    Sore Throat   This is a new problem. The current episode started yesterday. The problem has been gradually worsening. Neither side of throat is experiencing more pain than the other. There has been no fever. The pain is at a severity of 2/10. Associated symptoms include congestion (nasal at night) and coughing. Pertinent negatives include no headaches or trouble swallowing. He has had no exposure to strep or mono. Treatments tried: Sudafed,Nyquil and throat lozenges,afrin. The treatment provided no relief.       HENT:  Positive for congestion (nasal at night), postnasal drip and sore throat. Negative for sinus pain, sinus pressure and trouble swallowing.         Hoarse   Respiratory:  Positive for cough.    Neurological:  Negative for headaches.      Objective:     Vitals:    11/21/24 1132   BP: 124/81   BP Location: Left arm   Patient Position: Sitting   Pulse: 60   Resp: 16   Temp: 98.2 °F (36.8 °C)   TempSrc: Tympanic   SpO2: 97%   Weight: 112.5 kg (248 lb)   Height: 5' 7" (1.702 m)      Physical Exam   Constitutional: He is oriented to person, place, and time. He appears well-developed. He is cooperative.  Non-toxic appearance. He does not appear ill. No distress.   HENT:   Head: Normocephalic and atraumatic.   Ears:   Right Ear: Hearing, tympanic membrane, external ear and ear canal normal.   Left Ear: Hearing, tympanic membrane, external ear and ear canal normal.   Nose: Rhinorrhea and congestion present. No mucosal edema or nasal " deformity. No epistaxis. Right sinus exhibits no maxillary sinus tenderness and no frontal sinus tenderness. Left sinus exhibits no maxillary sinus tenderness and no frontal sinus tenderness.   Mouth/Throat: Uvula is midline and mucous membranes are normal. No trismus in the jaw. Normal dentition. No uvula swelling. Posterior oropharyngeal erythema present. No oropharyngeal exudate or posterior oropharyngeal edema.   Eyes: Conjunctivae and lids are normal. No scleral icterus.   Neck: Trachea normal and phonation normal. Neck supple. No edema present. No erythema present. No neck rigidity present.   Cardiovascular: Normal rate, regular rhythm, normal heart sounds and normal pulses.   Pulmonary/Chest: Effort normal and breath sounds normal. No respiratory distress. He has no decreased breath sounds. He has no rhonchi.   Abdominal: Normal appearance.   Lymphadenopathy:     He has cervical adenopathy.   Neurological: He is alert and oriented to person, place, and time. He exhibits normal muscle tone.   Skin: Skin is warm, intact and not diaphoretic.   Psychiatric: His speech is normal and behavior is normal. Judgment and thought content normal.   Nursing note and vitals reviewed.    Results for orders placed or performed in visit on 11/21/24   POCT Strep A, Molecular    Collection Time: 11/21/24 12:10 PM   Result Value Ref Range    Molecular Strep A, POC Positive (A) Negative     Acceptable Yes       Assessment:     1. Sore throat    2. Strep pharyngitis    3. Postnasal drip    4. Acute cough        Plan:       Sore throat  -     POCT Strep A, Molecular    2. Strep pharyngitis  -     amoxicillin (AMOXIL) 500 MG Tab; Take 1 tablet (500 mg total) by mouth every 12 (twelve) hours. for 10 days  Dispense: 20 tablet; Refill: 0    3. Postnasal drip  -     fluticasone propionate (FLONASE) 50 mcg/actuation nasal spray; 2 sprays (100 mcg total) by Each Nostril route 2 (two) times a day. for 7 days  Dispense: 9.9  mL; Refill: 0    4. Acute cough  -     promethazine-dextromethorphan (PROMETHAZINE-DM) 6.25-15 mg/5 mL Syrp; Take 5 mLs by mouth nightly as needed (cough). This medication can make you feel drowsy  Dispense: 35 mL; Refill: 0          Sore Throat Discharge Instructions, Adult   About this topic   Swelling at the back of your throat is called pharyngitis. Swelling of your throat and tonsils is tonsillopharyngitis. Both are commonly called a sore throat. Your sore throat is likely caused by a virus. Most of the time, a sore throat will go away without antibiotics in a week or two.           What care is needed at home?   Ask your doctor what you need to do when you go home. Make sure you ask questions if you do not understand what the doctor says. This way you will know what you need to do.  To help ease a sore throat you can:  Use a sore throat spray.  Suck on hard candy or throat lozenges.  Gargle with warm saltwater a few times each day. Mix of 1/4 teaspoon (1.25 grams) salt in 8 ounces (240 mL) of warm water.  Use a cool mist humidifier to help you breathe easier.  You may want to take medicine like acetaminophen, ibuprofen, or naproxen for pain.  If you decide to take over-the-counter cough or cold medicines, follow the directions on the label carefully. Be sure you do not take more than one medicine that contains acetaminophen. Also, if you have a heart problem or high blood pressure, check with your doctor before you take any of these medicines.  Wash your hands often. This will help keep others healthy.  What follow-up care is needed?   Your doctor may ask you to make visits to the office to check on your progress. Be sure to keep these visits.  What drugs may be needed?   Take your drugs as ordered by your doctor. Do not skip doses or stop when you feel better. The doctor may order drugs to:  Prevent or fight an infection  Help with pain  Lower fever  Help nasal congestion and runny nose  Soothe the  throat  Will physical activity be limited?   You may need to rest at home for 1 to 2 days or until you are feeling well.  What changes to diet are needed?   If your throat feels too sore to eat solid foods you may drink juice, milk, milkshakes, or soups. Talk to your doctor about what diet is proper for your condition.  What can be done to prevent this health problem?   Wash your hands often. Be sure to wash after you blow your nose or take care of others with a sore throat.  Do not share utensils and drinking glasses with someone who has a sore throat. Wash these objects with hot, soapy water.  Do not share your foods or drinks with others while you are sick. You might infect them.  Throw away used tissues right away and then wash your hands.  Get a new toothbrush after signs are gone or you are done with your antibiotics.  When do I need to call the doctor?   You have trouble breathing.  Your neck, tongue, or throat is swelling.  You are drooling because you cannot swallow your saliva.  You have very bad pain in your throat that keeps you from eating or drinking anything.  There are large, painful lumps in your neck.  You have blisters in the back of your throat.  Teach Back: Helping You Understand   The Teach Back Method helps you understand the information we are giving you. After you talk with the staff, tell them in your own words what you learned. This helps to make sure the staff has described each thing clearly. It also helps to explain things that may have been confusing. Before going home, make sure you can do these:  I can tell you about my condition.  I can tell you what may help ease my pain.  I can tell you what I will do if I have trouble breathing or swallowing or have large painful lumps in my throat.  Where can I learn more?   Centers for Disease Control and Prevention  https://www.cdc.gov/antibiotic-use/community/for-patients/common-illnesses/sore-throat.html   [facility]  Health  https://www.health.[place]t.nz/your-health/conditions-and-treatments/diseases-and-illnesses/sore-throat   [facility] Choices  https://www.nhs.uk/conditions/sore-throat/   Last Reviewed Date   2021-06-08  Consumer Information Use and Disclaimer   This information is not specific medical advice and does not replace information you receive from your health care provider. This is only a brief summary of general information. It does NOT include all information about conditions, illnesses, injuries, tests, procedures, treatments, therapies, discharge instructions or life-style choices that may apply to you. You must talk with your health care provider for complete information about your health and treatment options. This information should not be used to decide whether or not to accept your health care providers advice, instructions or recommendations. Only your health care provider has the knowledge and training to provide advice that is right for you.  Copyright   Copyright © 2021 [employer] and its affiliates and/or licensors. All rights reserved.

## 2025-05-16 ENCOUNTER — OFFICE VISIT (OUTPATIENT)
Dept: URGENT CARE | Facility: CLINIC | Age: 65
End: 2025-05-16
Payer: COMMERCIAL

## 2025-05-16 VITALS
DIASTOLIC BLOOD PRESSURE: 81 MMHG | OXYGEN SATURATION: 96 % | SYSTOLIC BLOOD PRESSURE: 132 MMHG | WEIGHT: 247 LBS | RESPIRATION RATE: 18 BRPM | TEMPERATURE: 98 F | HEIGHT: 67 IN | BODY MASS INDEX: 38.77 KG/M2 | HEART RATE: 60 BPM

## 2025-05-16 DIAGNOSIS — J18.9 COMMUNITY ACQUIRED PNEUMONIA, UNSPECIFIED LATERALITY: ICD-10-CM

## 2025-05-16 DIAGNOSIS — R05.1 ACUTE COUGH: Primary | ICD-10-CM

## 2025-05-16 PROCEDURE — 71046 X-RAY EXAM CHEST 2 VIEWS: CPT | Mod: S$GLB,,, | Performed by: RADIOLOGY

## 2025-05-16 RX ORDER — DEXAMETHASONE SODIUM PHOSPHATE 10 MG/ML
10 INJECTION INTRAMUSCULAR; INTRAVENOUS ONCE
Status: COMPLETED | OUTPATIENT
Start: 2025-05-16 | End: 2025-05-16

## 2025-05-16 RX ORDER — AZITHROMYCIN 250 MG/1
250 TABLET, FILM COATED ORAL DAILY
Qty: 6 TABLET | Refills: 0 | Status: SHIPPED | OUTPATIENT
Start: 2025-05-16 | End: 2025-05-22

## 2025-05-16 RX ORDER — BENZONATATE 100 MG/1
100 CAPSULE ORAL 3 TIMES DAILY PRN
Qty: 30 CAPSULE | Refills: 0 | Status: SHIPPED | OUTPATIENT
Start: 2025-05-16 | End: 2025-05-26

## 2025-05-16 RX ORDER — GUAIFENESIN AND DEXTROMETHORPHAN HYDROBROMIDE 10; 100 MG/5ML; MG/5ML
5 SYRUP ORAL EVERY 6 HOURS
Qty: 50 ML | Refills: 0 | Status: SHIPPED | OUTPATIENT
Start: 2025-05-16 | End: 2025-05-26

## 2025-05-16 RX ADMIN — DEXAMETHASONE SODIUM PHOSPHATE 10 MG: 10 INJECTION INTRAMUSCULAR; INTRAVENOUS at 08:05

## 2025-05-16 NOTE — PATIENT INSTRUCTIONS
Please follow up with your Primary care provider within 2-5 days if your signs and symptoms have not resolved or worsen.     If your condition worsens or fails to improve we recommend that you receive another evaluation at the emergency room immediately or contact your primary medical clinic to discuss your concerns.   You must understand that you have received an Urgent Care treatment only and that you may be released before all of your medical problems are known or treated. You, the patient, will arrange for follow up care as instructed.     RED FLAGS/WARNING SYMPTOMS DISCUSSED WITH PATIENT THAT WOULD WARRANT EMERGENT MEDICAL ATTENTION. PATIENT VERBALIZED UNDERSTANDING

## 2025-05-16 NOTE — PROGRESS NOTES
"Subjective:      Patient ID: Femi Geronimo is a 65 y.o. male.    Vitals:  height is 5' 7" (1.702 m) and weight is 112 kg (247 lb). His tympanic temperature is 97.6 °F (36.4 °C). His blood pressure is 132/81 and his pulse is 60. His respiration is 18 and oxygen saturation is 96%.     Chief Complaint: Cough    66 y/o male c/o with a cough he has been having for 1 week. Patient states its a dry cough that's mostly in his chest. Patient states he took OTC meds and had no relief. Patient states no other symthoms.     Cough  This is a new problem. The current episode started in the past 7 days. The problem has been gradually worsening. The problem occurs constantly. Pertinent negatives include no chest pain, chills, ear congestion, ear pain, fever, headaches, heartburn, hemoptysis, myalgias, nasal congestion, postnasal drip, rash, rhinorrhea, sore throat, shortness of breath, sweats, weight loss or wheezing. Nothing aggravates the symptoms. He has tried OTC cough suppressant for the symptoms. The treatment provided no relief. There is no history of asthma, bronchiectasis, bronchitis, COPD, emphysema, environmental allergies or pneumonia.       Constitution: Negative for chills and fever.   HENT:  Negative for ear pain, postnasal drip and sore throat.    Cardiovascular:  Negative for chest pain.   Respiratory:  Positive for cough. Negative for bloody sputum, shortness of breath and wheezing.    Gastrointestinal:  Negative for heartburn.   Musculoskeletal:  Negative for muscle ache.   Skin:  Negative for rash.   Allergic/Immunologic: Negative for environmental allergies.   Neurological:  Negative for headaches.      Objective:     Physical Exam   Constitutional: He is oriented to person, place, and time. He appears well-developed. He is cooperative.  Non-toxic appearance. He does not appear ill. No distress.   HENT:   Head: Normocephalic and atraumatic.   Ears:   Right Ear: Hearing, tympanic membrane, external ear and ear " canal normal.   Left Ear: Hearing, tympanic membrane, external ear and ear canal normal.   Nose: Nose normal. No mucosal edema, rhinorrhea or nasal deformity. No epistaxis. Right sinus exhibits no maxillary sinus tenderness and no frontal sinus tenderness. Left sinus exhibits no maxillary sinus tenderness and no frontal sinus tenderness.   Mouth/Throat: Uvula is midline, oropharynx is clear and moist and mucous membranes are normal. No trismus in the jaw. Normal dentition. No uvula swelling. No oropharyngeal exudate, posterior oropharyngeal edema or posterior oropharyngeal erythema.   Eyes: Conjunctivae and lids are normal. No scleral icterus.   Neck: Trachea normal and phonation normal. Neck supple. No edema present. No erythema present. No neck rigidity present.   Cardiovascular: Normal rate, regular rhythm, normal heart sounds and normal pulses.   Pulmonary/Chest: Effort normal. No respiratory distress. He has no decreased breath sounds. He has wheezes in the left upper field and the left middle field. He has no rhonchi.   Abdominal: Normal appearance.   Musculoskeletal: Normal range of motion.         General: No deformity. Normal range of motion.   Neurological: He is alert and oriented to person, place, and time. He exhibits normal muscle tone. Coordination normal.   Skin: Skin is warm, dry, intact, not diaphoretic and not pale.   Psychiatric: His speech is normal and behavior is normal. Judgment and thought content normal.   Nursing note and vitals reviewed.      Assessment:     1. Acute cough    2. Community acquired pneumonia, unspecified laterality      COMPARISON:  None     FINDINGS:  Cardiac contours grossly within normal limits.     Question several small nodules in the right greater than left lung, predominately involving the upper to mid lung zones.  Ill-defined platelike opacity at the left lung zone may reflect atelectasis and/or scar.     No definite pneumothorax or large volume pleural effusion.      No acute findings in the visualized abdomen.  Osseous and soft tissue structures appear without definite acute change.     Impression:     Question several small nodules in the right greater than left lung, predominately involving the upper to mid lung zones.  Findings are nonspecific and could reflect infectious or noninfectious inflammatory etiology in the appropriate clinical context.  Noting this, malignancy would not be excluded.  As such, recommend further characterization dedicated chest CT.     This report was flagged in Epic as abnormal.  Plan:       Acute cough  -     XR CHEST PA AND LATERAL; Future; Expected date: 05/16/2025  -     dexAMETHasone injection 10 mg    Community acquired pneumonia, unspecified laterality  -     azithromycin (Z-JOHN) 250 MG tablet; Take 1 tablet (250 mg total) by mouth once daily. Take 2 pills the first day then 1 pill a day for 4 days for 6 doses  Dispense: 6 tablet; Refill: 0  -     benzonatate (TESSALON) 100 MG capsule; Take 1 capsule (100 mg total) by mouth 3 (three) times daily as needed.  Dispense: 30 capsule; Refill: 0  -     dextromethorphan-guaiFENesin  mg/5 ml (ROBITUSSIN-DM)  mg/5 mL liquid; Take 5 mLs by mouth every 6 (six) hours. for 10 days  Dispense: 50 mL; Refill: 0      Called member to discuss xray finding and will follow up with PCP in 1 week.     Please follow up with your Primary care provider within 2-5 days if your signs and symptoms have not resolved or worsen.     If your condition worsens or fails to improve we recommend that you receive another evaluation at the emergency room immediately or contact your primary medical clinic to discuss your concerns.   You must understand that you have received an Urgent Care treatment only and that you may be released before all of your medical problems are known or treated. You, the patient, will arrange for follow up care as instructed.     RED FLAGS/WARNING SYMPTOMS DISCUSSED WITH PATIENT THAT WOULD  WARRANT EMERGENT MEDICAL ATTENTION. PATIENT VERBALIZED UNDERSTANDING.